# Patient Record
Sex: FEMALE | Race: WHITE | Employment: UNEMPLOYED | URBAN - METROPOLITAN AREA
[De-identification: names, ages, dates, MRNs, and addresses within clinical notes are randomized per-mention and may not be internally consistent; named-entity substitution may affect disease eponyms.]

---

## 2024-04-02 ENCOUNTER — OFFICE VISIT (OUTPATIENT)
Dept: FAMILY MEDICINE CLINIC | Facility: CLINIC | Age: 44
End: 2024-04-02
Payer: COMMERCIAL

## 2024-04-02 VITALS
HEIGHT: 62 IN | TEMPERATURE: 97 F | RESPIRATION RATE: 16 BRPM | SYSTOLIC BLOOD PRESSURE: 122 MMHG | WEIGHT: 164 LBS | DIASTOLIC BLOOD PRESSURE: 64 MMHG | BODY MASS INDEX: 30.18 KG/M2 | HEART RATE: 92 BPM

## 2024-04-02 DIAGNOSIS — Z12.4 SCREENING FOR CERVICAL CANCER: ICD-10-CM

## 2024-04-02 DIAGNOSIS — Z01.419 WELL WOMAN EXAM WITH ROUTINE GYNECOLOGICAL EXAM: Primary | ICD-10-CM

## 2024-04-02 DIAGNOSIS — Z12.11 SCREEN FOR COLON CANCER: ICD-10-CM

## 2024-04-02 DIAGNOSIS — Z12.31 VISIT FOR SCREENING MAMMOGRAM: ICD-10-CM

## 2024-04-02 PROCEDURE — 99386 PREV VISIT NEW AGE 40-64: CPT | Performed by: FAMILY MEDICINE

## 2024-04-02 PROCEDURE — 3078F DIAST BP <80 MM HG: CPT | Performed by: FAMILY MEDICINE

## 2024-04-02 PROCEDURE — 87624 HPV HI-RISK TYP POOLED RSLT: CPT | Performed by: FAMILY MEDICINE

## 2024-04-02 PROCEDURE — 3008F BODY MASS INDEX DOCD: CPT | Performed by: FAMILY MEDICINE

## 2024-04-02 PROCEDURE — 3074F SYST BP LT 130 MM HG: CPT | Performed by: FAMILY MEDICINE

## 2024-04-02 RX ORDER — LEVOTHYROXINE SODIUM 88 UG/1
88 TABLET ORAL DAILY
COMMUNITY
Start: 2024-01-01

## 2024-04-02 NOTE — PROGRESS NOTES
SUBJECTIVE:  Chief Complaint   Patient presents with    Establish Care     New pt     Physical     WWE/ PAP     HPI:    Struggling with maintaining a healthy weight. Feels this is related to thyroid and perimenopausal.     Current medical conditions:  Hashimoto's hypothyroidism: Had a rash in 2020. Saw derm without clear cause. Felt it was thyroid related. Has hypothyroidism, positive antibodies. Now following with Dr. Marie (does bioidentical hormones). On levothyroxine 88mcg and liothyronine SR 7.5mcg. Trying to follow a GF and dairy free diet.     Is on progesterone starting on Day 12 of cycle (every other day).     Health Maintenance:  Vaccines: reviewed as below. Indicated today:   Immunization History   Administered Date(s) Administered    Covid-19 Vaccine Moderna 100 mcg/0.5 ml 01/22/2021, 02/19/2021    DTAP 02/12/2008    Hep A, Adult 02/12/2008    Typhoid,VI Polysacharide IM 02/12/2008     Obesity screening: Body mass index is 30 kg/m².  Diabetes screening:  due  Hypercholesterolemia screening: due  Depression screen: no concerns, does have anxiety  Osteoporosis: No history of pathologic fractures. No steroid use, smoking, risk of falls, excessive alcohol use.  Cervical Cancer screening: Last Pap: 4/2014    Colon Cancer screening: Family history of colon cancer? no Last colonoscopy: never  Breast Cancer screening: Family history of breast cancer? no Last mammogram: never  STI: Desires testing for STIs? no  Tobacco use:  reports that she has never smoked. She has never used smokeless tobacco.    ROS: Negative unless stated above    HISTORY:  Past Medical History:   Diagnosis Date    Abnormal Pap smear 04/08/2006    [place]    Allergic rhinitis     Anesthesia complication 01/01/2012    spinal/nausea, felt out of it, significant blood loss    Anxiety     H/O pregnancy 2010,2012    G 2  P 2002    Hypothyroidism Hashimotos detected in 2020    Pap smear for cervical cancer screening 09/19/2012    [facility]    Pap  smear for cervical cancer screening ,,,    [facility]    Pap smear of cervix to confirm normal smear following abnormal smear 2006    [facility]    PONV (postoperative nausea and vomiting)     Visual impairment     contacts/glasses      Past Surgical History:   Procedure Laterality Date      2010, 2012, 2015    D & C  2014          TONSILLECTOMY      age 7      Family History   Problem Relation Age of Onset    No Known Problems Mother     Cancer Father         prostate    Thyroid Disorder Father       Social History     Socioeconomic History    Marital status:     Number of children: 3   Occupational History    Occupation: Works at 's office   Tobacco Use    Smoking status: Never    Smokeless tobacco: Never   Substance and Sexual Activity    Alcohol use: Yes     Alcohol/week: 2.0 standard drinks of alcohol     Types: 2 Glasses of wine per week    Drug use: No    Sexual activity: Yes     Partners: Male   Other Topics Concern    Caffeine Concern No    Stress Concern Yes    Weight Concern Yes    Special Diet No    Exercise No    Seat Belt No    Self-Exams Yes        Allergies:  No Known Allergies    OBJECTIVE:  PHYSICAL EXAM:  Vitals:    24 0942   BP: 122/64   Pulse: 92   Resp: 16   Temp: 97.1 °F (36.2 °C)   Weight: 164 lb (74.4 kg)   Height: 5' 2\" (1.575 m)     Physical Examination: General appearance - alert, well appearing, and in no distress and normal appearing weight  Mental status - alert, oriented to person, place, and time, normal mood, behavior, speech, dress, motor activity, and thought processes  Ears - bilateral TM's and external ear canals normal  Neck - supple, no significant adenopathy  Chest - clear to auscultation, no wheezes, rales or rhonchi, symmetric air entry  Heart - normal rate, regular rhythm, normal S1, S2, no murmurs, rubs, clicks or gallops  Abdomen - soft, nontender, nondistended, no masses or organomegaly  Breasts - breasts appear normal, no  suspicious masses, no skin or nipple changes or axillary nodes  Pelvic - normal external genitalia, vulva, vagina, cervix, uterus and adnexa  Extremities - peripheral pulses normal, no pedal edema, no clubbing or cyanosis    ASSESSMENT & PLAN:  Rea Walker is a 43 year old female is here for Establish Care (New pt ) and Physical (WWE/ PAP)    Problem List Items Addressed This Visit    None  Visit Diagnoses       Well woman exam with routine gynecological exam    -  Primary    Relevant Medications    PROGESTERONE OR    Other Relevant Orders    CBC With Differential With Platelet    Comp Metabolic Panel (14)    Vitamin B12    Screening for cervical cancer        Relevant Medications    PROGESTERONE OR    Other Relevant Orders    ThinPrep PAP Smear    Hpv Dna  High Risk , Thin Prep Collect    Visit for screening mammogram        Relevant Medications    levothyroxine 88 MCG Oral Tab    Other Relevant Orders    JAMISON GREGORY 2D+3D SCREENING BILAT (CPT=77067/63934)    Screen for colon cancer        Relevant Orders    COLOGUARD COLON CANCER SCREENING (EXTERNAL)            Rea was seen today for establish care and physical.    Diagnoses and all orders for this visit:    Well woman exam with routine gynecological exam  Routine health maintenance reviewed, discussed and updated as below. This includes: labs, pap, mammo, colon ca screen. Healthy diet and exercise reviewed.   -     CBC With Differential With Platelet; Future  -     Comp Metabolic Panel (14); Future  -     Vitamin B12; Future    Screening for cervical cancer  -     ThinPrep PAP Smear; Future  -     Hpv Dna  High Risk , Thin Prep Collect; Future    Visit for screening mammogram  -     JAMISON GREGORY 2D+3D SCREENING BILAT (CPT=77067/83111); Future    Screen for colon cancer  -     COLOGUARD COLON CANCER SCREENING (EXTERNAL)    Continue on thyroid meds prescribed elsewhere.     Call or return to clinic prn if these symptoms worsen or fail to improve as anticipated. RTC  in 1 year.   Vilma Todd DO  4/2/2024 7:50 AM    Note to Patient  The 21st Century Cures Act makes medical notes like these available to patients in the interest of transparency. However, be advised this is a medical document and is intended as [id number] communication; it is written in medical language and may appear blunt, direct, or contain abbreviations or verbiage that are unfamiliar. Medical documents are intended to carry relevant information, facts as evident, and the clinical opinion of the practitioner.     This report has been produced using speech recognition software, and may contain errors related to grammar, punctuation, spelling, words or phrases unrecognized or not translated appropriately to text; these errors may be referred to the dictating provider for further clarification and/or addendum as needed.

## 2024-04-03 LAB — HPV I/H RISK 1 DNA SPEC QL NAA+PROBE: NEGATIVE

## 2024-04-07 LAB
.: NORMAL
.: NORMAL

## 2024-05-15 ENCOUNTER — HOSPITAL ENCOUNTER (OUTPATIENT)
Dept: MAMMOGRAPHY | Age: 44
Discharge: HOME | End: 2024-05-15
Attending: FAMILY MEDICINE

## 2024-05-15 DIAGNOSIS — Z12.31 VISIT FOR SCREENING MAMMOGRAM: ICD-10-CM

## 2024-05-15 PROCEDURE — 77063 BREAST TOMOSYNTHESIS BI: CPT | Performed by: FAMILY MEDICINE

## 2024-05-15 PROCEDURE — 77067 SCR MAMMO BI INCL CAD: CPT | Performed by: FAMILY MEDICINE

## 2025-07-12 ENCOUNTER — PATIENT OUTREACH (OUTPATIENT)
Dept: FAMILY MEDICINE CLINIC | Facility: CLINIC | Age: 45
End: 2025-07-12